# Patient Record
Sex: FEMALE | Race: WHITE | NOT HISPANIC OR LATINO | Employment: FULL TIME | URBAN - METROPOLITAN AREA
[De-identification: names, ages, dates, MRNs, and addresses within clinical notes are randomized per-mention and may not be internally consistent; named-entity substitution may affect disease eponyms.]

---

## 2024-01-23 ENCOUNTER — OFFICE VISIT (OUTPATIENT)
Dept: PRIMARY CARE | Facility: CLINIC | Age: 39
End: 2024-01-23
Payer: COMMERCIAL

## 2024-01-23 VITALS — BODY MASS INDEX: 35.15 KG/M2 | WEIGHT: 245 LBS

## 2024-01-23 DIAGNOSIS — R11.0 NAUSEA: ICD-10-CM

## 2024-01-23 DIAGNOSIS — K62.89 RECTAL DISCOMFORT: ICD-10-CM

## 2024-01-23 DIAGNOSIS — K62.5 RECTAL BLEEDING: Primary | ICD-10-CM

## 2024-01-23 PROCEDURE — 4004F PT TOBACCO SCREEN RCVD TLK: CPT | Performed by: FAMILY MEDICINE

## 2024-01-23 PROCEDURE — 99212 OFFICE O/P EST SF 10 MIN: CPT | Performed by: FAMILY MEDICINE

## 2024-01-23 ASSESSMENT — ENCOUNTER SYMPTOMS
BLOOD IN STOOL: 1
NAUSEA: 1
RECTAL PAIN: 1
CONSTITUTIONAL NEGATIVE: 1
CARDIOVASCULAR NEGATIVE: 1
RESPIRATORY NEGATIVE: 1
ABDOMINAL PAIN: 1

## 2024-01-23 ASSESSMENT — PAIN SCALES - GENERAL: PAINLEVEL: 4

## 2024-01-23 NOTE — PROGRESS NOTES
"Subjective   Patient ID: Margarita Caruso is a 38 y.o. female who presents for GI Problem (Nausea, intermittent rectal bleeding, lower abdominal discomfort for about 2 months. Has tried ibuprofen for pressure in abdomen and also metamucil.).    GI problem: began 12/12 when she had a normal bowel movement followed by blood and mucus, on the same day she began having rectal discomfort and pressure, on 12/23 the symptoms subsided, symptoms began again on 1/8 with a normal bowel movement followed by blood and mucus, rectal discomfort began again and has not resolved, rectal discomfort turns into pain at the end of the day, when she stands up she feels as if the intestines are going to \"pop out\", has pain in the sigmoid area with every bowel movement, once the stool passes the pain resolves quickly, she has also experienced nausea and lower abdominal discomfort with these symptoms, partially attributes lower abdominal discomfort with ovulation, admits to stress from the symptoms, denies fatigue, has taken ibuprofen and Metamucil, diagnosed with diverticulosis and endometriosis via CT of the abdomen and pelvis in 2021, has never had a colonoscopy.    Review of Systems   Constitutional: Negative.    Respiratory: Negative.     Cardiovascular: Negative.    Gastrointestinal:  Positive for abdominal pain, blood in stool, nausea and rectal pain.     Objective   Wt 111 kg (245 lb)   BMI 35.15 kg/m²     Physical Exam  Vitals reviewed.   Constitutional:       Appearance: Normal appearance. She is obese.   Cardiovascular:      Rate and Rhythm: Normal rate and regular rhythm.   Pulmonary:      Effort: Pulmonary effort is normal.      Breath sounds: Normal breath sounds.   Abdominal:      General: Bowel sounds are normal.      Palpations: Abdomen is soft. There is no mass.      Tenderness: There is no abdominal tenderness. There is no guarding.   Neurological:      Mental Status: She is alert.     Assessment/Plan   Diagnoses and " all orders for this visit:  Rectal bleeding/Rectal discomfort  New.  Referral to Gastroenterology  Await input.  Follow up as directed.   Nausea  New.  Secondary to rectal bleeding/discomfort.  See plan above.

## 2024-07-24 ENCOUNTER — APPOINTMENT (OUTPATIENT)
Dept: PRIMARY CARE | Facility: CLINIC | Age: 39
End: 2024-07-24
Payer: COMMERCIAL

## 2024-07-26 ENCOUNTER — APPOINTMENT (OUTPATIENT)
Dept: PRIMARY CARE | Facility: CLINIC | Age: 39
End: 2024-07-26
Payer: COMMERCIAL

## 2024-07-30 ENCOUNTER — APPOINTMENT (OUTPATIENT)
Dept: PRIMARY CARE | Facility: CLINIC | Age: 39
End: 2024-07-30
Payer: COMMERCIAL

## 2024-08-02 ENCOUNTER — APPOINTMENT (OUTPATIENT)
Dept: RADIOLOGY | Facility: CLINIC | Age: 39
End: 2024-08-02
Payer: COMMERCIAL

## 2024-08-02 DIAGNOSIS — Z12.31 SCREENING MAMMOGRAM FOR BREAST CANCER: ICD-10-CM

## 2024-08-19 ENCOUNTER — APPOINTMENT (OUTPATIENT)
Dept: PRIMARY CARE | Facility: CLINIC | Age: 39
End: 2024-08-19
Payer: COMMERCIAL

## 2024-08-19 VITALS
HEIGHT: 69 IN | OXYGEN SATURATION: 97 % | HEART RATE: 94 BPM | SYSTOLIC BLOOD PRESSURE: 122 MMHG | TEMPERATURE: 95.9 F | WEIGHT: 248 LBS | DIASTOLIC BLOOD PRESSURE: 80 MMHG | BODY MASS INDEX: 36.73 KG/M2

## 2024-08-19 DIAGNOSIS — K58.1 IRRITABLE BOWEL SYNDROME WITH CONSTIPATION: ICD-10-CM

## 2024-08-19 DIAGNOSIS — K21.00 GASTROESOPHAGEAL REFLUX DISEASE WITH ESOPHAGITIS WITHOUT HEMORRHAGE: ICD-10-CM

## 2024-08-19 DIAGNOSIS — K11.3 SALIVARY GLAND ABSCESS: Primary | ICD-10-CM

## 2024-08-19 DIAGNOSIS — R11.0 NAUSEA: ICD-10-CM

## 2024-08-19 DIAGNOSIS — K59.00 CONSTIPATION, UNSPECIFIED CONSTIPATION TYPE: ICD-10-CM

## 2024-08-19 PROBLEM — E66.01 MORBID OBESITY (MULTI): Status: ACTIVE | Noted: 2024-08-19

## 2024-08-19 PROBLEM — F17.200 SMOKER: Status: ACTIVE | Noted: 2024-08-19

## 2024-08-19 PROBLEM — K21.9 GASTROESOPHAGEAL REFLUX DISEASE WITHOUT ESOPHAGITIS: Status: ACTIVE | Noted: 2022-07-20

## 2024-08-19 PROBLEM — K62.5 RECTAL HEMORRHAGE: Status: ACTIVE | Noted: 2024-08-19

## 2024-08-19 PROBLEM — K62.89 RECTAL PAIN: Status: ACTIVE | Noted: 2024-08-19

## 2024-08-19 PROBLEM — R25.3 MUSCLE TWITCH: Status: ACTIVE | Noted: 2024-08-19

## 2024-08-19 PROBLEM — G25.81 RESTLESS LEGS: Status: ACTIVE | Noted: 2024-08-19

## 2024-08-19 PROBLEM — J06.9 UPPER RESPIRATORY INFECTION: Status: ACTIVE | Noted: 2024-08-19

## 2024-08-19 PROBLEM — K57.30 DIVERTICULOSIS OF COLON: Status: ACTIVE | Noted: 2024-08-19

## 2024-08-19 PROBLEM — J45.20 MILD INTERMITTENT ASTHMA (HHS-HCC): Status: ACTIVE | Noted: 2024-08-19

## 2024-08-19 PROBLEM — E78.6 HIGH-DENSITY LIPOPROTEIN DEFICIENCY: Status: ACTIVE | Noted: 2024-08-19

## 2024-08-19 PROBLEM — U07.1 DISEASE DUE TO SEVERE ACUTE RESPIRATORY SYNDROME CORONAVIRUS 2 (SARS-COV-2): Status: ACTIVE | Noted: 2024-08-19

## 2024-08-19 PROBLEM — M25.50 ARTHRALGIA: Status: ACTIVE | Noted: 2024-08-19

## 2024-08-19 PROBLEM — M54.10 RADICULAR PAIN: Status: ACTIVE | Noted: 2024-08-19

## 2024-08-19 PROBLEM — H65.23 BILATERAL CHRONIC SEROUS OTITIS MEDIA: Status: ACTIVE | Noted: 2024-08-19

## 2024-08-19 PROBLEM — R63.5 WEIGHT GAIN: Status: ACTIVE | Noted: 2024-08-19

## 2024-08-19 PROBLEM — E03.9 HYPOTHYROIDISM: Status: ACTIVE | Noted: 2024-08-19

## 2024-08-19 PROBLEM — N80.9 ENDOMETRIOSIS: Status: ACTIVE | Noted: 2024-08-19

## 2024-08-19 PROBLEM — E16.2 HYPOGLYCEMIA: Status: ACTIVE | Noted: 2024-08-19

## 2024-08-19 PROBLEM — R53.83 FATIGUE: Status: ACTIVE | Noted: 2024-08-19

## 2024-08-19 PROCEDURE — 3008F BODY MASS INDEX DOCD: CPT

## 2024-08-19 PROCEDURE — 4004F PT TOBACCO SCREEN RCVD TLK: CPT

## 2024-08-19 PROCEDURE — 99204 OFFICE O/P NEW MOD 45 MIN: CPT

## 2024-08-19 RX ORDER — CEPHALEXIN 500 MG/1
500 CAPSULE ORAL 2 TIMES DAILY
Qty: 14 CAPSULE | Refills: 0 | Status: SHIPPED | OUTPATIENT
Start: 2024-08-19 | End: 2024-08-26

## 2024-08-19 RX ORDER — SUCRALFATE 1 G/1
1 TABLET ORAL
Qty: 40 TABLET | Refills: 0 | Status: SHIPPED | OUTPATIENT
Start: 2024-08-19 | End: 2024-08-29

## 2024-08-19 RX ORDER — ESOMEPRAZOLE MAGNESIUM 40 MG/1
40 CAPSULE, DELAYED RELEASE ORAL
Qty: 30 CAPSULE | Refills: 1 | Status: SHIPPED | OUTPATIENT
Start: 2024-08-19 | End: 2024-10-18

## 2024-08-19 ASSESSMENT — PATIENT HEALTH QUESTIONNAIRE - PHQ9
SUM OF ALL RESPONSES TO PHQ9 QUESTIONS 1 AND 2: 0
1. LITTLE INTEREST OR PLEASURE IN DOING THINGS: NOT AT ALL
2. FEELING DOWN, DEPRESSED OR HOPELESS: NOT AT ALL

## 2024-08-19 ASSESSMENT — ENCOUNTER SYMPTOMS
LOSS OF SENSATION IN FEET: 0
OCCASIONAL FEELINGS OF UNSTEADINESS: 0
DEPRESSION: 0

## 2024-08-19 NOTE — PROGRESS NOTES
"Subjective   Patient ID: Margarita Caruso is a 39 y.o. female who presents for New Patient Visit (Swollen lymph node on L side of neck x2 months. Had a colonoscopy in march where she had polyps removed. She then noticed abdominal pain/discomfort on left side upper abdominal. She states it feels full and if she bends over it clicks and pops).  Margarita is a 39 year old patient who presents today to establish care  She has been experiencing multiple problems which she would like to address    LUQ abdominal pain  --She has long standing history of IBS like symptoms, Constipation with diarrhea at times, incomplete bowel emptying  --She had Colonoscopy earlier this year, she states her symptoms have \"never been better\" since the bowel prep for the scope.   --Her symptoms returned a couple of months after  --Modifiable risk factors include: poor diet, smoking (1.5PPD for 25 years)  --she takes TUMS and Gas-X which helps intermittently  --recommend gastro consult, ordered carafate and PPI for trial   --differential includes Peptic Ulcer, Esophagitis d/t GERD      Enlarged Lymph node  --present for many months, did improve with two doses of old abx  --trial 3rd gen cephalosporin, if no better obtain US        Vitals:    08/19/24 1400   BP: 122/80   Pulse: 94   Temp: 35.5 °C (95.9 °F)   SpO2: 97%       Review of Systems    Objective   Physical Exam  Vitals and nursing note reviewed.   Constitutional:       General: She is not in acute distress.     Appearance: Normal appearance.   Abdominal:      General: Abdomen is flat. Bowel sounds are increased. There is no distension.      Tenderness: There is generalized abdominal tenderness and tenderness in the epigastric area. There is no right CVA tenderness, left CVA tenderness or guarding. Negative signs include Cooney's sign, McBurney's sign and psoas sign.       Lymphadenopathy:      Head:      Left side of head: Submandibular adenopathy present.      Comments: Left " submandibular lymph node enlargement without pain, not movable.   Neurological:      Mental Status: She is alert.         Assessment/Plan   Problem List Items Addressed This Visit       Nausea    Gastroesophageal reflux disease with esophagitis without hemorrhage    Relevant Medications    esomeprazole (NexIUM) 40 mg DR capsule    sucralfate (Carafate) 1 gram tablet    Other Relevant Orders    Referral to Gastroenterology    Irritable bowel syndrome with constipation    Relevant Medications    esomeprazole (NexIUM) 40 mg DR capsule    sucralfate (Carafate) 1 gram tablet    Other Relevant Orders    Referral to Gastroenterology    Salivary gland abscess - Primary    Relevant Medications    cephalexin (Keflex) 500 mg capsule            Thank you for coming in today, please call my office if you have any concerns or questions.     Devang FULTON, CNP

## 2024-08-19 NOTE — PATIENT INSTRUCTIONS
Try to cut down on smoking, NSAIDs  Let me know if no improvement of swelling, we will do US  Gastro referral and Nexium / Carafate    Schedule for yearly physical in the near future    Thank you for coming in today, if any questions or concerns arise, please call my office.   Devang Callejas, APRN-CNP

## 2024-08-27 ENCOUNTER — APPOINTMENT (OUTPATIENT)
Dept: GASTROENTEROLOGY | Facility: CLINIC | Age: 39
End: 2024-08-27
Payer: COMMERCIAL

## 2024-09-05 DIAGNOSIS — K58.1 IRRITABLE BOWEL SYNDROME WITH CONSTIPATION: ICD-10-CM

## 2024-09-05 DIAGNOSIS — K21.00 GASTROESOPHAGEAL REFLUX DISEASE WITH ESOPHAGITIS WITHOUT HEMORRHAGE: ICD-10-CM

## 2024-09-05 RX ORDER — SUCRALFATE 1 G/1
1 TABLET ORAL
Qty: 40 TABLET | Refills: 0 | Status: SHIPPED | OUTPATIENT
Start: 2024-09-05 | End: 2024-09-15

## 2024-09-27 ENCOUNTER — APPOINTMENT (OUTPATIENT)
Dept: GASTROENTEROLOGY | Facility: CLINIC | Age: 39
End: 2024-09-27
Payer: COMMERCIAL

## 2024-10-22 ENCOUNTER — APPOINTMENT (OUTPATIENT)
Dept: GASTROENTEROLOGY | Facility: CLINIC | Age: 39
End: 2024-10-22
Payer: COMMERCIAL

## 2024-10-22 VITALS
WEIGHT: 259.1 LBS | DIASTOLIC BLOOD PRESSURE: 81 MMHG | HEIGHT: 69 IN | SYSTOLIC BLOOD PRESSURE: 132 MMHG | TEMPERATURE: 98.4 F | HEART RATE: 88 BPM | BODY MASS INDEX: 38.38 KG/M2 | OXYGEN SATURATION: 98 % | RESPIRATION RATE: 18 BRPM

## 2024-10-22 DIAGNOSIS — K21.00 GASTROESOPHAGEAL REFLUX DISEASE WITH ESOPHAGITIS WITHOUT HEMORRHAGE: ICD-10-CM

## 2024-10-22 DIAGNOSIS — K58.1 IRRITABLE BOWEL SYNDROME WITH CONSTIPATION: ICD-10-CM

## 2024-10-22 PROCEDURE — 3008F BODY MASS INDEX DOCD: CPT

## 2024-10-22 PROCEDURE — 99204 OFFICE O/P NEW MOD 45 MIN: CPT

## 2024-10-22 PROCEDURE — 4004F PT TOBACCO SCREEN RCVD TLK: CPT

## 2024-10-22 RX ORDER — PSYLLIUM SEED
PACKET (EA) ORAL
Qty: 660 G | Refills: 3 | Status: SHIPPED | OUTPATIENT
Start: 2024-10-22

## 2024-10-22 RX ORDER — OMEPRAZOLE 40 MG/1
40 CAPSULE, DELAYED RELEASE ORAL
Qty: 90 CAPSULE | Refills: 0 | Status: SHIPPED | OUTPATIENT
Start: 2024-10-22 | End: 2025-01-20

## 2024-10-22 RX ORDER — OMEPRAZOLE 20 MG/1
20 TABLET, DELAYED RELEASE ORAL
COMMUNITY

## 2024-10-22 RX ORDER — FAMOTIDINE 20 MG/1
20 TABLET, FILM COATED ORAL 2 TIMES DAILY PRN
Qty: 180 TABLET | Refills: 0 | Status: SHIPPED | OUTPATIENT
Start: 2024-10-22 | End: 2025-01-20

## 2024-10-22 ASSESSMENT — ENCOUNTER SYMPTOMS
ROS GI COMMENTS: SEE HPI
ABDOMINAL PAIN: 1
OCCASIONAL FEELINGS OF UNSTEADINESS: 0
UNEXPECTED WEIGHT CHANGE: 1
ABDOMINAL DISTENTION: 1

## 2024-10-22 ASSESSMENT — PAIN SCALES - GENERAL: PAINLEVEL_OUTOF10: 2

## 2024-10-22 NOTE — ASSESSMENT & PLAN NOTE
Persistent GERD symptoms despite PPI therapy and Carafate  Currently taking Prilosec OTC, will increase dose to 40 mg once daily in the morning  Famotidine 20 mg twice daily as needed  Smoking cessation, decreasing coffee intake encouraged  EGD ordered for further evaluation-will need to be off PPI therapy for 2 weeks prior to procedure  Orders:    Referral to Gastroenterology    omeprazole (PriLOSEC) 40 mg DR capsule; Take 1 capsule (40 mg) by mouth once daily in the morning. Take before meals. Do not crush or chew.  Take on an empty stomach 30 mins prior to breakfast.    Esophagogastroduodenoscopy (EGD); Future    famotidine (Pepcid) 20 mg tablet; Take 1 tablet (20 mg) by mouth 2 times a day as needed for heartburn or indigestion (nausea).

## 2024-10-22 NOTE — PATIENT INSTRUCTIONS
Restart Metamucil and probiotics   EGD ordered at [facility]  Change omeprazole dose to 40 mg daily- take on an empty stomach 30 mins before a meal- stop 2 weeks before the procedure, then resume after. Okay to take famotidine and TUMS. NO PEPTO.   Famotidine 20 mg ordered twice daily as needed  Follow up in 2-3 months

## 2024-10-22 NOTE — PROGRESS NOTES
History Of Present Illness  Margarita Caruso is a 39 y.o. female presenting to GI clinic with a chief complaint of heartburn, indigestion, reflux.  Symptoms started earlier this summer, she had left upper quadrant pain and sensation of trapped air in her abdomen.  She felt tightness and burning in the left upper quadrant.  Symptoms worsened 15 minutes to an hour after eating or drinking coffee.  She was told that she had an ulcer and was started on Nexium and Carafate by her PCP which worked for about a month, then gradually stopped working.  Now she is taking Prilose OTC and has been feeling okay for the last couple of days but symptoms are still present.    Patient thinks that she has IBS-she has always had inconsistent bowel movements since childhood.  She was on Metamucil and probiotics in the past but stopped them around the time for colonoscopy.  She states that she just grew out of the habit of taking them.  Patient underwent colonoscopy for evaluation of rectal discomfort and BRBPR.  She had internal hemorrhoids and polyps.    Social History  She reports that she has been smoking cigarettes. She has never used smokeless tobacco. She reports that she does not drink alcohol and does not use drugs.  She does not take NSAIDs on a regular basis    Family History  Family History   Problem Relation Name Age of Onset    Thyroid cancer Mother      Hashimoto's thyroiditis Mother      Cholelithiasis Mother      Lung cancer Father      Transient ischemic attack Sister      Early menopause Sister       The patient does not have a FH of CRC. she does not have a FH of IBD. Grandfather had 12 polyps removed and became septic at 74.     Review of Systems   Constitutional:  Positive for unexpected weight change (Weight gain).   Gastrointestinal:  Positive for abdominal distention and abdominal pain.        See HPI   All other systems reviewed and are negative.        Physical Exam  Constitutional:       General: She is not  "in acute distress.     Appearance: Normal appearance. She is obese. She is not ill-appearing.   HENT:      Head: Normocephalic and atraumatic.      Mouth/Throat:      Mouth: Mucous membranes are moist.   Eyes:      General: No scleral icterus.     Conjunctiva/sclera: Conjunctivae normal.      Pupils: Pupils are equal, round, and reactive to light.   Pulmonary:      Effort: Pulmonary effort is normal.   Abdominal:      General: Bowel sounds are normal. There is no distension.      Palpations: Abdomen is soft. There is no mass.      Tenderness: There is no abdominal tenderness.      Hernia: No hernia is present.   Musculoskeletal:         General: Normal range of motion.      Cervical back: Normal range of motion.   Skin:     General: Skin is warm and dry.      Coloration: Skin is not jaundiced or pale.   Neurological:      Mental Status: She is alert. Mental status is at baseline.   Psychiatric:         Mood and Affect: Mood normal.         Behavior: Behavior normal.          Last Vital Signs  /81 (BP Location: Left arm, Patient Position: Sitting, BP Cuff Size: Large adult)   Pulse 88   Temp 36.9 °C (98.4 °F)   Resp 18   Ht 1.753 m (5' 9\")   Wt 118 kg (259 lb 1.6 oz)   SpO2 98%   BMI 38.26 kg/m²      Relevant Results  Lab Results   Component Value Date    WBC 10.4 10/25/2017    HGB 15.9 10/25/2017    HCT 46.1 (H) 10/25/2017    MCV 92 10/25/2017     10/25/2017      Lab Results   Component Value Date    GLUCOSE 96 01/14/2022    CALCIUM 8.9 01/14/2022     01/14/2022    K 4.5 01/14/2022    CO2 23 (L) 01/14/2022     01/14/2022    BUN 9 01/14/2022    CREATININE 0.7 01/14/2022      Lab Results   Component Value Date    ALT 23 01/14/2022    AST 18 01/14/2022    ALKPHOS 56 01/14/2022    BILITOT 0.5 01/14/2022    INR 0.9 10/25/2017    No results found for: \"IRON\", \"TIBC\", \"IRONSAT\", \"FERRITIN\", \"GERUTRAT50\", \"FOLATE\"  No results found for: \"CALPS\", \"CRP\" No results found for: \"LIPASE\" No results " "found for: \"HGBA1C\"     EGD/COLONOSCOPY/COLOGUARD  EGD-never       Colonoscopy 2/7/2024 with Dr. Gibson- polyps, IH- no report available        IMAGING  CT ABD/PEL W IV CON 2/11/2021 [facility]  IMPRESSION:  Complex right adnexal cyst probably hemorrhagic ovarian cyst and probably  corresponding to hypoechoic structure noted within right pelvis on  ultrasound performed on the same date.  No other pelvic masses or fluid collection.  Few sigmoid diverticuli without CT evidence for diverticulitis.  No bowel obstruction.  Splenic size upper limits of normal.    === 10/23/17 ===  CT ABDOMEN PELVIS W WO CONTRAST  - Impression -  No urinary tract stones are identified.  No acute pathologic findings are identified.  Assessment & Plan  Gastroesophageal reflux disease with esophagitis without hemorrhage  Persistent GERD symptoms despite PPI therapy and Carafate  Currently taking Prilosec OTC, will increase dose to 40 mg once daily in the morning  Famotidine 20 mg twice daily as needed  Smoking cessation, decreasing coffee intake encouraged  EGD ordered for further evaluation-will need to be off PPI therapy for 2 weeks prior to procedure  Orders:    Referral to Gastroenterology    omeprazole (PriLOSEC) 40 mg DR capsule; Take 1 capsule (40 mg) by mouth once daily in the morning. Take before meals. Do not crush or chew.  Take on an empty stomach 30 mins prior to breakfast.    Esophagogastroduodenoscopy (EGD); Future    famotidine (Pepcid) 20 mg tablet; Take 1 tablet (20 mg) by mouth 2 times a day as needed for heartburn or indigestion (nausea).    Irritable bowel syndrome with constipation  Restart Metamucil and probiotics   Orders:    Referral to Gastroenterology    psyllium husk (MetamuciL) 3.4 gram/5.4 gram powder; Start with one teaspoon a day for one week, then increase to two teaspoons a day for one week, then increase to one tablespoon daily. Mix fiber in at least 8 ounces of water/uncarbonated beverage.    Follow-up after EGD is " completed    DONALDO Torres-CNP

## 2024-10-22 NOTE — ASSESSMENT & PLAN NOTE
Restart Metamucil and probiotics   Orders:    Referral to Gastroenterology    psyllium husk (MetamuciL) 3.4 gram/5.4 gram powder; Start with one teaspoon a day for one week, then increase to two teaspoons a day for one week, then increase to one tablespoon daily. Mix fiber in at least 8 ounces of water/uncarbonated beverage.

## 2024-10-28 ENCOUNTER — APPOINTMENT (OUTPATIENT)
Dept: GASTROENTEROLOGY | Facility: CLINIC | Age: 39
End: 2024-10-28
Payer: COMMERCIAL

## 2024-12-02 ENCOUNTER — APPOINTMENT (OUTPATIENT)
Dept: GASTROENTEROLOGY | Facility: CLINIC | Age: 39
End: 2024-12-02
Payer: COMMERCIAL

## 2024-12-06 ENCOUNTER — TELEPHONE (OUTPATIENT)
Dept: PREADMISSION TESTING | Facility: HOSPITAL | Age: 39
End: 2024-12-06

## 2024-12-09 ENCOUNTER — ANESTHESIA EVENT (OUTPATIENT)
Dept: OPERATING ROOM | Facility: HOSPITAL | Age: 39
End: 2024-12-09
Payer: COMMERCIAL

## 2024-12-10 ENCOUNTER — HOSPITAL ENCOUNTER (OUTPATIENT)
Dept: OPERATING ROOM | Facility: HOSPITAL | Age: 39
Setting detail: OUTPATIENT SURGERY
Discharge: HOME | End: 2024-12-10
Payer: COMMERCIAL

## 2024-12-10 ENCOUNTER — ANESTHESIA (OUTPATIENT)
Dept: OPERATING ROOM | Facility: HOSPITAL | Age: 39
End: 2024-12-10
Payer: COMMERCIAL

## 2024-12-10 VITALS
HEIGHT: 69 IN | OXYGEN SATURATION: 97 % | WEIGHT: 260.14 LBS | SYSTOLIC BLOOD PRESSURE: 123 MMHG | DIASTOLIC BLOOD PRESSURE: 73 MMHG | RESPIRATION RATE: 16 BRPM | TEMPERATURE: 97.5 F | BODY MASS INDEX: 38.53 KG/M2 | HEART RATE: 83 BPM

## 2024-12-10 DIAGNOSIS — K21.00 GASTROESOPHAGEAL REFLUX DISEASE WITH ESOPHAGITIS WITHOUT HEMORRHAGE: ICD-10-CM

## 2024-12-10 PROCEDURE — 7100000010 HC PHASE TWO TIME - EACH INCREMENTAL 1 MINUTE: Performed by: ANESTHESIOLOGY

## 2024-12-10 PROCEDURE — 3700000002 HC GENERAL ANESTHESIA TIME - EACH INCREMENTAL 1 MINUTE: Performed by: ANESTHESIOLOGY

## 2024-12-10 PROCEDURE — 2500000004 HC RX 250 GENERAL PHARMACY W/ HCPCS (ALT 636 FOR OP/ED)

## 2024-12-10 PROCEDURE — 3700000001 HC GENERAL ANESTHESIA TIME - INITIAL BASE CHARGE: Performed by: ANESTHESIOLOGY

## 2024-12-10 PROCEDURE — 3600000007 HC OR TIME - EACH INCREMENTAL 1 MINUTE - PROCEDURE LEVEL TWO: Performed by: ANESTHESIOLOGY

## 2024-12-10 PROCEDURE — 43239 EGD BIOPSY SINGLE/MULTIPLE: CPT | Performed by: INTERNAL MEDICINE

## 2024-12-10 PROCEDURE — A43239 PR EDG TRANSORAL BIOPSY SINGLE/MULTIPLE: Performed by: ANESTHESIOLOGY

## 2024-12-10 PROCEDURE — A43239 PR EDG TRANSORAL BIOPSY SINGLE/MULTIPLE

## 2024-12-10 PROCEDURE — 3600000002 HC OR TIME - INITIAL BASE CHARGE - PROCEDURE LEVEL TWO: Performed by: ANESTHESIOLOGY

## 2024-12-10 PROCEDURE — 7100000009 HC PHASE TWO TIME - INITIAL BASE CHARGE: Performed by: ANESTHESIOLOGY

## 2024-12-10 RX ORDER — ONDANSETRON HYDROCHLORIDE 2 MG/ML
4 INJECTION, SOLUTION INTRAVENOUS ONCE AS NEEDED
Status: DISCONTINUED | OUTPATIENT
Start: 2024-12-10 | End: 2024-12-11 | Stop reason: HOSPADM

## 2024-12-10 RX ORDER — DROPERIDOL 2.5 MG/ML
0.62 INJECTION, SOLUTION INTRAMUSCULAR; INTRAVENOUS ONCE AS NEEDED
Status: DISCONTINUED | OUTPATIENT
Start: 2024-12-10 | End: 2024-12-11 | Stop reason: HOSPADM

## 2024-12-10 RX ORDER — PROPOFOL 10 MG/ML
INJECTION, EMULSION INTRAVENOUS AS NEEDED
Status: DISCONTINUED | OUTPATIENT
Start: 2024-12-10 | End: 2024-12-10

## 2024-12-10 RX ORDER — MIDAZOLAM HYDROCHLORIDE 1 MG/ML
INJECTION INTRAMUSCULAR; INTRAVENOUS AS NEEDED
Status: DISCONTINUED | OUTPATIENT
Start: 2024-12-10 | End: 2024-12-10

## 2024-12-10 RX ORDER — LIDOCAINE HCL/PF 100 MG/5ML
SYRINGE (ML) INTRAVENOUS AS NEEDED
Status: DISCONTINUED | OUTPATIENT
Start: 2024-12-10 | End: 2024-12-10

## 2024-12-10 SDOH — HEALTH STABILITY: MENTAL HEALTH: CURRENT SMOKER: 1

## 2024-12-10 ASSESSMENT — PAIN SCALES - GENERAL
PAINLEVEL_OUTOF10: 0 - NO PAIN
PAIN_LEVEL: 1
PAINLEVEL_OUTOF10: 0 - NO PAIN
PAINLEVEL_OUTOF10: 0 - NO PAIN

## 2024-12-10 ASSESSMENT — COLUMBIA-SUICIDE SEVERITY RATING SCALE - C-SSRS
2. HAVE YOU ACTUALLY HAD ANY THOUGHTS OF KILLING YOURSELF?: NO
6. HAVE YOU EVER DONE ANYTHING, STARTED TO DO ANYTHING, OR PREPARED TO DO ANYTHING TO END YOUR LIFE?: NO
1. IN THE PAST MONTH, HAVE YOU WISHED YOU WERE DEAD OR WISHED YOU COULD GO TO SLEEP AND NOT WAKE UP?: NO

## 2024-12-10 ASSESSMENT — PAIN - FUNCTIONAL ASSESSMENT
PAIN_FUNCTIONAL_ASSESSMENT: 0-10

## 2024-12-10 NOTE — Clinical Note
PATIENT'S PHONE WITH FAMILY. PATIENT'S GLASSES IN BELONGING BAG. BELONGING BAG LABELED AND ON CART.

## 2024-12-10 NOTE — ANESTHESIA PREPROCEDURE EVALUATION
Patient: Margarita Caruso    Procedure Information       Date/Time: 12/10/24 1110    Scheduled providers: Shruthi Mcgowan MD; Ivana Frost DO    Procedure: EGD    Location: [facility] OR            Relevant Problems   Anesthesia (within normal limits)      Pulmonary   (+) Mild intermittent asthma      GI   (+) Gastroesophageal reflux disease with esophagitis without hemorrhage   (+) Gastroesophageal reflux disease without esophagitis   (+) Irritable bowel syndrome with constipation   (+) Rectal hemorrhage      Endocrine   (+) Hypothyroidism   (+) Morbid obesity (Multi)      ID   (+) Disease due to severe acute respiratory syndrome coronavirus 2 (SARS-CoV-2)   (+) Upper respiratory infection      GYN   (+) Endometriosis       Clinical information reviewed:   Tobacco  Allergies  Meds   Med Hx  Surg Hx  OB Status  Fam Hx  Soc   Hx        NPO Detail:  NPO/Void Status  Date of Last Liquid: 12/10/24  Time of Last Liquid: 0800  Date of Last Solid: 12/09/24  Last Intake Type: Clear fluids         Physical Exam    Airway  Mallampati: I  TM distance: >3 FB  Neck ROM: full     Cardiovascular - normal exam  Rate: normal     Dental - normal exam     Pulmonary - normal exam  Breath sounds clear to auscultation     Abdominal            Anesthesia Plan    History of general anesthesia?: yes  History of complications of general anesthesia?: no    ASA 3     MAC     The patient is a current smoker.  Patient was previously instructed to abstain from smoking on day of procedure.  Patient smoked on day of procedure.    intravenous induction   Anesthetic plan and risks discussed with patient.  Use of blood products discussed with patient who.    Plan discussed with CRNA.

## 2024-12-10 NOTE — DISCHARGE INSTRUCTIONS

## 2024-12-10 NOTE — H&P
"History Of Present Illness  Margarita Caruso is a 39 y.o. female presenting GI lab for EGD     Past Medical History  History reviewed. No pertinent past medical history.    Surgical History  Past Surgical History:   Procedure Laterality Date     SECTION, CLASSIC  2003     SECTION, CLASSIC  08/15/2010     SECTION, CLASSIC  2013     SECTION, CLASSIC  2015    HYSTERECTOMY  2015    OVARIAN CYST REMOVAL          Social History  She reports that she has been smoking cigarettes. She started smoking about 28 years ago. She has a 28.9 pack-year smoking history. She has never used smokeless tobacco. She reports that she does not drink alcohol and does not use drugs.    Family History  Family History   Problem Relation Name Age of Onset    Thyroid cancer Mother      Hashimoto's thyroiditis Mother      Cholelithiasis Mother      Lung cancer Father      Transient ischemic attack Sister      Early menopause Sister          Allergies  Sulfa (sulfonamide antibiotics), Bee venom protein (honey bee), and Penicillins    Review of Systems     Physical Exam  Cardiovascular:      Rate and Rhythm: Regular rhythm. Tachycardia present.      Comments: Mild  Pulmonary:      Effort: Pulmonary effort is normal. No respiratory distress.   Neurological:      Mental Status: She is alert and oriented to person, place, and time.          Last Recorded Vitals  Blood pressure (!) 175/95, pulse 96, temperature 36.6 °C (97.9 °F), resp. rate 18, height 1.753 m (5' 9\"), weight 118 kg (260 lb 2.3 oz), SpO2 96%.    Relevant Results             Assessment/Plan   Assessment & Plan  Gastroesophageal reflux disease with esophagitis without hemorrhage             EGD with biopsy      Shruthi Mcgowan MD    "

## 2024-12-10 NOTE — ANESTHESIA POSTPROCEDURE EVALUATION
Patient: Margarita Caruso    Procedure Summary       Date: 12/10/24 Room / Location: [facility] OR    Anesthesia Start: 1133 Anesthesia Stop: 1154    Procedure: EGD Diagnosis: Gastroesophageal reflux disease with esophagitis without hemorrhage    Scheduled Providers: Shruthi Mcgowan MD; Ivana Frost DO Responsible Provider: Ivana Frost DO    Anesthesia Type: MAC ASA Status: 3            Anesthesia Type: MAC    Vitals Value Taken Time   /72 12/10/24 1224   Temp 36.4 °C (97.5 °F) 12/10/24 1155   Pulse 83 12/10/24 1215   Resp 16 12/10/24 1215   SpO2 96 % 12/10/24 1229   Vitals shown include unfiled device data.    Anesthesia Post Evaluation    Patient location during evaluation: PACU  Patient participation: complete - patient participated  Level of consciousness: awake  Pain score: 1  Pain management: adequate  Airway patency: patent  Cardiovascular status: acceptable  Respiratory status: acceptable  Hydration status: acceptable  Postoperative Nausea and Vomiting: none        No notable events documented.

## 2024-12-18 LAB
LABORATORY COMMENT REPORT: NORMAL
PATH REPORT.FINAL DX SPEC: NORMAL
PATH REPORT.GROSS SPEC: NORMAL
PATH REPORT.RELEVANT HX SPEC: NORMAL
PATH REPORT.TOTAL CANCER: NORMAL

## 2025-01-09 ENCOUNTER — APPOINTMENT (OUTPATIENT)
Dept: PRIMARY CARE | Facility: CLINIC | Age: 40
End: 2025-01-09
Payer: COMMERCIAL

## 2025-01-09 VITALS
TEMPERATURE: 98.1 F | SYSTOLIC BLOOD PRESSURE: 140 MMHG | BODY MASS INDEX: 38.79 KG/M2 | HEIGHT: 69 IN | WEIGHT: 261.9 LBS | HEART RATE: 106 BPM | OXYGEN SATURATION: 97 % | DIASTOLIC BLOOD PRESSURE: 84 MMHG

## 2025-01-09 DIAGNOSIS — F17.219 CIGARETTE NICOTINE DEPENDENCE WITH NICOTINE-INDUCED DISORDER: ICD-10-CM

## 2025-01-09 DIAGNOSIS — Z13.89 SCREENING FOR MULTIPLE CONDITIONS: ICD-10-CM

## 2025-01-09 DIAGNOSIS — Z00.00 HEALTHCARE MAINTENANCE: ICD-10-CM

## 2025-01-09 DIAGNOSIS — Z13.1 DIABETES MELLITUS SCREENING: ICD-10-CM

## 2025-01-09 DIAGNOSIS — R03.0 BORDERLINE HYPERTENSION: ICD-10-CM

## 2025-01-09 DIAGNOSIS — E55.9 VITAMIN D DEFICIENCY: ICD-10-CM

## 2025-01-09 DIAGNOSIS — E66.01 MORBID OBESITY (MULTI): Primary | ICD-10-CM

## 2025-01-09 DIAGNOSIS — F33.8 SEASONAL AFFECTIVE DISORDER (CMS-HCC): ICD-10-CM

## 2025-01-09 LAB
ALBUMIN SERPL BCP-MCNC: 4.3 G/DL (ref 3.4–5)
ALP SERPL-CCNC: 55 U/L (ref 33–110)
ALT SERPL W P-5'-P-CCNC: 18 U/L (ref 7–45)
ANION GAP SERPL CALC-SCNC: 11 MMOL/L (ref 10–20)
AST SERPL W P-5'-P-CCNC: 16 U/L (ref 9–39)
BASOPHILS # BLD AUTO: 0.07 X10*3/UL (ref 0–0.1)
BASOPHILS NFR BLD AUTO: 0.6 %
BILIRUB SERPL-MCNC: 0.4 MG/DL (ref 0–1.2)
BUN SERPL-MCNC: 10 MG/DL (ref 6–23)
CALCIUM SERPL-MCNC: 8.6 MG/DL (ref 8.6–10.3)
CHLORIDE SERPL-SCNC: 103 MMOL/L (ref 98–107)
CHOLEST SERPL-MCNC: 170 MG/DL (ref 0–199)
CHOLESTEROL/HDL RATIO: 3.3
CO2 SERPL-SCNC: 27 MMOL/L (ref 21–32)
CREAT SERPL-MCNC: 0.65 MG/DL (ref 0.5–1.05)
EGFRCR SERPLBLD CKD-EPI 2021: >90 ML/MIN/1.73M*2
EOSINOPHIL # BLD AUTO: 0.23 X10*3/UL (ref 0–0.7)
EOSINOPHIL NFR BLD AUTO: 2.1 %
ERYTHROCYTE [DISTWIDTH] IN BLOOD BY AUTOMATED COUNT: 11.9 % (ref 11.5–14.5)
GLUCOSE SERPL-MCNC: 79 MG/DL (ref 74–99)
HCT VFR BLD AUTO: 43.5 % (ref 36–46)
HDLC SERPL-MCNC: 50.8 MG/DL
HGB BLD-MCNC: 15 G/DL (ref 12–16)
IMM GRANULOCYTES # BLD AUTO: 0.04 X10*3/UL (ref 0–0.7)
IMM GRANULOCYTES NFR BLD AUTO: 0.4 % (ref 0–0.9)
LDLC SERPL CALC-MCNC: 72 MG/DL
LYMPHOCYTES # BLD AUTO: 2.76 X10*3/UL (ref 1.2–4.8)
LYMPHOCYTES NFR BLD AUTO: 24.8 %
MCH RBC QN AUTO: 31.8 PG (ref 26–34)
MCHC RBC AUTO-ENTMCNC: 34.5 G/DL (ref 32–36)
MCV RBC AUTO: 92 FL (ref 80–100)
MONOCYTES # BLD AUTO: 0.63 X10*3/UL (ref 0.1–1)
MONOCYTES NFR BLD AUTO: 5.7 %
NEUTROPHILS # BLD AUTO: 7.38 X10*3/UL (ref 1.2–7.7)
NEUTROPHILS NFR BLD AUTO: 66.4 %
NON HDL CHOLESTEROL: 119 MG/DL (ref 0–149)
NRBC BLD-RTO: 0 /100 WBCS (ref 0–0)
PLATELET # BLD AUTO: 316 X10*3/UL (ref 150–450)
POTASSIUM SERPL-SCNC: 4.8 MMOL/L (ref 3.5–5.3)
PROT SERPL-MCNC: 6.4 G/DL (ref 6.4–8.2)
RBC # BLD AUTO: 4.71 X10*6/UL (ref 4–5.2)
SODIUM SERPL-SCNC: 136 MMOL/L (ref 136–145)
TRIGL SERPL-MCNC: 237 MG/DL (ref 0–149)
TSH SERPL-ACNC: 1.11 MIU/L (ref 0.44–3.98)
VLDL: 47 MG/DL (ref 0–40)
WBC # BLD AUTO: 11.1 X10*3/UL (ref 4.4–11.3)

## 2025-01-09 PROCEDURE — 3008F BODY MASS INDEX DOCD: CPT

## 2025-01-09 PROCEDURE — 82607 VITAMIN B-12: CPT

## 2025-01-09 PROCEDURE — 82306 VITAMIN D 25 HYDROXY: CPT

## 2025-01-09 PROCEDURE — 99214 OFFICE O/P EST MOD 30 MIN: CPT

## 2025-01-09 PROCEDURE — 84443 ASSAY THYROID STIM HORMONE: CPT

## 2025-01-09 PROCEDURE — 80053 COMPREHEN METABOLIC PANEL: CPT

## 2025-01-09 PROCEDURE — 85025 COMPLETE CBC W/AUTO DIFF WBC: CPT

## 2025-01-09 PROCEDURE — 80061 LIPID PANEL: CPT

## 2025-01-09 PROCEDURE — 83036 HEMOGLOBIN GLYCOSYLATED A1C: CPT

## 2025-01-09 RX ORDER — ACETAMINOPHEN 500 MG
1 TABLET ORAL DAILY
Qty: 1 EACH | Refills: 0 | Status: SHIPPED | OUTPATIENT
Start: 2025-01-09

## 2025-01-09 RX ORDER — BUPROPION HYDROCHLORIDE 150 MG/1
150 TABLET ORAL EVERY MORNING
Qty: 30 TABLET | Refills: 0 | Status: SHIPPED | OUTPATIENT
Start: 2025-01-09 | End: 2025-02-08

## 2025-01-09 NOTE — PROGRESS NOTES
Subjective   Patient ID: Margarita Caruso is a 39 y.o. female who presents for Obesity (Would like to discuss starting a GLP-1. She has been doing weight watchers for years.).    Subjective  Margarita Caruso is a 39 y.o. female here for discussion regarding weight loss. She has noted a weight gain of approximately 40 pounds over the last several months. History of eating disorders: none. There is a family history positive for obesity in the patient. Previous treatments for obesity include nutritionist consultation, self-directed dieting, supervised diet program, and Weight Watchers. Obesity associated medical conditions: none. Obesity associated medications: none. Cardiovascular risk factors besides obesity: obesity (BMI >= 30 kg/m2).    [unfilled]    Objective  Body mass index is 38.96 kg/m².  [unfilled]     Assessment/Plan  Obesity. I assessed Margarita to be in an action stage with respect to weight loss.  General weight loss/lifestyle modification strategies discussed (elicit support from others; identify saboteurs; non-food rewards, etc).  Behavioral treatment: stress management, Bupropion.  Informal exercise measures discussed, e.g. taking stairs instead of elevator.  Regular aerobic exercise program discussed.  Pharmacotherapy as ordered.          Vitals:    01/09/25 0914   BP: 140/84   Pulse: 106   Temp: 36.7 °C (98.1 °F)   SpO2: 97%       Review of Systems    Objective   Physical Exam    Assessment/Plan   Problem List Items Addressed This Visit       Morbid obesity (Multi) - Primary     Other Visit Diagnoses       Cigarette nicotine dependence with nicotine-induced disorder        Relevant Medications    buPROPion XL (Wellbutrin XL) 150 mg 24 hr tablet    Borderline hypertension        Relevant Medications    blood pressure test kit-large kit    Vitamin D deficiency        Relevant Orders    Vitamin D 25-Hydroxy,Total (for eval of Vitamin D levels)    Screening for multiple conditions        Relevant  Orders    CBC and Auto Differential    Healthcare maintenance        Relevant Orders    Comprehensive Metabolic Panel    Lipid Panel    TSH with reflex to Free T4 if abnormal    Vitamin B12    Diabetes mellitus screening        Relevant Orders    Hemoglobin A1C    Seasonal affective disorder (CMS-HCC)                     Thank you for coming in today, please call my office if you have any concerns or questions.     Devang FULTON, CNP

## 2025-01-09 NOTE — PATIENT INSTRUCTIONS
Wellbutrin start  Check BP at home  See me in 1 month for physical and follow up    Blood owrk today we will go over results at your next appointment.    Thank you for coming in today, if any questions or concerns arise, please call my office.   Devang Callejas, DONALDO-CNP

## 2025-01-10 LAB
25(OH)D3 SERPL-MCNC: 23 NG/ML (ref 30–100)
EST. AVERAGE GLUCOSE BLD GHB EST-MCNC: 97 MG/DL
HBA1C MFR BLD: 5 %
VIT B12 SERPL-MCNC: 368 PG/ML (ref 211–911)

## 2025-01-13 DIAGNOSIS — E78.2 MODERATE MIXED HYPERLIPIDEMIA NOT REQUIRING STATIN THERAPY: Primary | ICD-10-CM

## 2025-01-15 ENCOUNTER — APPOINTMENT (OUTPATIENT)
Dept: PRIMARY CARE | Facility: CLINIC | Age: 40
End: 2025-01-15
Payer: COMMERCIAL

## 2025-01-24 ENCOUNTER — APPOINTMENT (OUTPATIENT)
Dept: PRIMARY CARE | Facility: CLINIC | Age: 40
End: 2025-01-24
Payer: COMMERCIAL

## 2025-01-27 ENCOUNTER — APPOINTMENT (OUTPATIENT)
Dept: RADIOLOGY | Facility: HOSPITAL | Age: 40
End: 2025-01-27
Payer: COMMERCIAL

## 2025-01-27 ENCOUNTER — HOSPITAL ENCOUNTER (EMERGENCY)
Facility: HOSPITAL | Age: 40
Discharge: HOME | End: 2025-01-27
Attending: EMERGENCY MEDICINE
Payer: COMMERCIAL

## 2025-01-27 VITALS
HEIGHT: 69 IN | DIASTOLIC BLOOD PRESSURE: 70 MMHG | WEIGHT: 260 LBS | HEART RATE: 99 BPM | RESPIRATION RATE: 16 BRPM | SYSTOLIC BLOOD PRESSURE: 125 MMHG | OXYGEN SATURATION: 98 % | TEMPERATURE: 98.3 F | BODY MASS INDEX: 38.51 KG/M2

## 2025-01-27 DIAGNOSIS — R11.2 NAUSEA AND VOMITING, UNSPECIFIED VOMITING TYPE: ICD-10-CM

## 2025-01-27 DIAGNOSIS — G43.001 MIGRAINE WITHOUT AURA AND WITH STATUS MIGRAINOSUS, NOT INTRACTABLE: Primary | ICD-10-CM

## 2025-01-27 LAB
ALBUMIN SERPL BCP-MCNC: 4.5 G/DL (ref 3.4–5)
ALP SERPL-CCNC: 59 U/L (ref 33–110)
ALT SERPL W P-5'-P-CCNC: 18 U/L (ref 7–45)
ANION GAP SERPL CALC-SCNC: 15 MMOL/L (ref 10–20)
AST SERPL W P-5'-P-CCNC: 15 U/L (ref 9–39)
BASOPHILS # BLD AUTO: 0.06 X10*3/UL (ref 0–0.1)
BASOPHILS NFR BLD AUTO: 0.4 %
BILIRUB SERPL-MCNC: 0.6 MG/DL (ref 0–1.2)
BUN SERPL-MCNC: 8 MG/DL (ref 6–23)
CALCIUM SERPL-MCNC: 9.2 MG/DL (ref 8.6–10.3)
CHLORIDE SERPL-SCNC: 104 MMOL/L (ref 98–107)
CO2 SERPL-SCNC: 23 MMOL/L (ref 21–32)
CREAT SERPL-MCNC: 0.71 MG/DL (ref 0.5–1.05)
EGFRCR SERPLBLD CKD-EPI 2021: >90 ML/MIN/1.73M*2
EOSINOPHIL # BLD AUTO: 0.16 X10*3/UL (ref 0–0.7)
EOSINOPHIL NFR BLD AUTO: 1 %
ERYTHROCYTE [DISTWIDTH] IN BLOOD BY AUTOMATED COUNT: 11.9 % (ref 11.5–14.5)
GLUCOSE SERPL-MCNC: 101 MG/DL (ref 74–99)
HCT VFR BLD AUTO: 43.1 % (ref 36–46)
HGB BLD-MCNC: 15.3 G/DL (ref 12–16)
IMM GRANULOCYTES # BLD AUTO: 0.07 X10*3/UL (ref 0–0.7)
IMM GRANULOCYTES NFR BLD AUTO: 0.4 % (ref 0–0.9)
LYMPHOCYTES # BLD AUTO: 2.97 X10*3/UL (ref 1.2–4.8)
LYMPHOCYTES NFR BLD AUTO: 19 %
MAGNESIUM SERPL-MCNC: 1.95 MG/DL (ref 1.6–2.4)
MCH RBC QN AUTO: 32.8 PG (ref 26–34)
MCHC RBC AUTO-ENTMCNC: 35.5 G/DL (ref 32–36)
MCV RBC AUTO: 92 FL (ref 80–100)
MONOCYTES # BLD AUTO: 0.98 X10*3/UL (ref 0.1–1)
MONOCYTES NFR BLD AUTO: 6.3 %
NEUTROPHILS # BLD AUTO: 11.38 X10*3/UL (ref 1.2–7.7)
NEUTROPHILS NFR BLD AUTO: 72.9 %
NRBC BLD-RTO: 0 /100 WBCS (ref 0–0)
PLATELET # BLD AUTO: 333 X10*3/UL (ref 150–450)
POTASSIUM SERPL-SCNC: 3.6 MMOL/L (ref 3.5–5.3)
PROT SERPL-MCNC: 7.2 G/DL (ref 6.4–8.2)
RBC # BLD AUTO: 4.67 X10*6/UL (ref 4–5.2)
SODIUM SERPL-SCNC: 138 MMOL/L (ref 136–145)
WBC # BLD AUTO: 15.6 X10*3/UL (ref 4.4–11.3)

## 2025-01-27 PROCEDURE — 96375 TX/PRO/DX INJ NEW DRUG ADDON: CPT

## 2025-01-27 PROCEDURE — 2500000004 HC RX 250 GENERAL PHARMACY W/ HCPCS (ALT 636 FOR OP/ED): Performed by: EMERGENCY MEDICINE

## 2025-01-27 PROCEDURE — 80053 COMPREHEN METABOLIC PANEL: CPT | Performed by: EMERGENCY MEDICINE

## 2025-01-27 PROCEDURE — 36415 COLL VENOUS BLD VENIPUNCTURE: CPT | Performed by: EMERGENCY MEDICINE

## 2025-01-27 PROCEDURE — 96361 HYDRATE IV INFUSION ADD-ON: CPT

## 2025-01-27 PROCEDURE — 70450 CT HEAD/BRAIN W/O DYE: CPT | Performed by: RADIOLOGY

## 2025-01-27 PROCEDURE — 99284 EMERGENCY DEPT VISIT MOD MDM: CPT | Mod: 25 | Performed by: EMERGENCY MEDICINE

## 2025-01-27 PROCEDURE — 83735 ASSAY OF MAGNESIUM: CPT | Performed by: EMERGENCY MEDICINE

## 2025-01-27 PROCEDURE — 70450 CT HEAD/BRAIN W/O DYE: CPT

## 2025-01-27 PROCEDURE — 2500000004 HC RX 250 GENERAL PHARMACY W/ HCPCS (ALT 636 FOR OP/ED)

## 2025-01-27 PROCEDURE — 96374 THER/PROPH/DIAG INJ IV PUSH: CPT

## 2025-01-27 PROCEDURE — 85025 COMPLETE CBC W/AUTO DIFF WBC: CPT | Performed by: EMERGENCY MEDICINE

## 2025-01-27 RX ORDER — DIPHENHYDRAMINE HYDROCHLORIDE 50 MG/ML
25 INJECTION INTRAMUSCULAR; INTRAVENOUS ONCE
Status: COMPLETED | OUTPATIENT
Start: 2025-01-27 | End: 2025-01-27

## 2025-01-27 RX ORDER — KETOROLAC TROMETHAMINE 10 MG/1
10 TABLET, FILM COATED ORAL EVERY 6 HOURS PRN
Qty: 20 TABLET | Refills: 0 | Status: SHIPPED | OUTPATIENT
Start: 2025-01-27 | End: 2025-02-01

## 2025-01-27 RX ORDER — KETOROLAC TROMETHAMINE 15 MG/ML
INJECTION, SOLUTION INTRAMUSCULAR; INTRAVENOUS
Status: COMPLETED
Start: 2025-01-27 | End: 2025-01-27

## 2025-01-27 RX ORDER — KETOROLAC TROMETHAMINE 15 MG/ML
15 INJECTION, SOLUTION INTRAMUSCULAR; INTRAVENOUS ONCE
Status: COMPLETED | OUTPATIENT
Start: 2025-01-27 | End: 2025-01-27

## 2025-01-27 RX ORDER — METOCLOPRAMIDE HYDROCHLORIDE 5 MG/ML
10 INJECTION INTRAMUSCULAR; INTRAVENOUS ONCE
Status: COMPLETED | OUTPATIENT
Start: 2025-01-27 | End: 2025-01-27

## 2025-01-27 RX ORDER — METOCLOPRAMIDE 10 MG/1
10 TABLET ORAL EVERY 8 HOURS PRN
Qty: 24 TABLET | Refills: 0 | Status: SHIPPED | OUTPATIENT
Start: 2025-01-27

## 2025-01-27 RX ADMIN — DIPHENHYDRAMINE HYDROCHLORIDE 25 MG: 50 INJECTION INTRAMUSCULAR; INTRAVENOUS at 20:59

## 2025-01-27 RX ADMIN — KETOROLAC TROMETHAMINE 15 MG: 15 INJECTION, SOLUTION INTRAMUSCULAR; INTRAVENOUS at 21:00

## 2025-01-27 RX ADMIN — METOCLOPRAMIDE 10 MG: 5 INJECTION, SOLUTION INTRAMUSCULAR; INTRAVENOUS at 21:00

## 2025-01-27 RX ADMIN — SODIUM CHLORIDE 1000 ML: 9 INJECTION, SOLUTION INTRAVENOUS at 21:00

## 2025-01-27 ASSESSMENT — PAIN DESCRIPTION - FREQUENCY
FREQUENCY: CONSTANT/CONTINUOUS
FREQUENCY: CONSTANT/CONTINUOUS

## 2025-01-27 ASSESSMENT — PAIN SCALES - PAIN ASSESSMENT IN ADVANCED DEMENTIA (PAINAD): TOTALSCORE: MEDICATION (SEE MAR)

## 2025-01-27 ASSESSMENT — PAIN DESCRIPTION - PAIN TYPE
TYPE: ACUTE PAIN
TYPE: ACUTE PAIN

## 2025-01-27 ASSESSMENT — PAIN - FUNCTIONAL ASSESSMENT
PAIN_FUNCTIONAL_ASSESSMENT: 0-10
PAIN_FUNCTIONAL_ASSESSMENT: 0-10

## 2025-01-27 ASSESSMENT — PAIN DESCRIPTION - ORIENTATION
ORIENTATION: RIGHT
ORIENTATION: RIGHT

## 2025-01-27 ASSESSMENT — PAIN DESCRIPTION - LOCATION
LOCATION: HEAD

## 2025-01-27 ASSESSMENT — PAIN DESCRIPTION - DESCRIPTORS
DESCRIPTORS: ACHING
DESCRIPTORS: ACHING

## 2025-01-27 ASSESSMENT — COLUMBIA-SUICIDE SEVERITY RATING SCALE - C-SSRS
6. HAVE YOU EVER DONE ANYTHING, STARTED TO DO ANYTHING, OR PREPARED TO DO ANYTHING TO END YOUR LIFE?: NO
1. IN THE PAST MONTH, HAVE YOU WISHED YOU WERE DEAD OR WISHED YOU COULD GO TO SLEEP AND NOT WAKE UP?: NO
2. HAVE YOU ACTUALLY HAD ANY THOUGHTS OF KILLING YOURSELF?: NO

## 2025-01-27 ASSESSMENT — PAIN DESCRIPTION - PROGRESSION: CLINICAL_PROGRESSION: GRADUALLY IMPROVING

## 2025-01-27 ASSESSMENT — PAIN SCALES - GENERAL
PAINLEVEL_OUTOF10: 10 - WORST POSSIBLE PAIN
PAINLEVEL_OUTOF10: 10 - WORST POSSIBLE PAIN
PAINLEVEL_OUTOF10: 7

## 2025-01-28 NOTE — DISCHARGE INSTRUCTIONS
Take prescribed medications only as directed/ only as needed.  At START of migraines take prescribed medications only as directed and drink some caffeine.  FOLLOW UP with you  primary care provider in next 3-7 days if possible

## 2025-01-28 NOTE — ED NOTES
Pt ready for discharge, discharge instructions reviewed with patient. IV removed. Pt left ED ambulatory with all belongings.    After discharge medications were ordered and sent to pharmacy, attempted to call patient to make sure she was aware but no answer. Left a message on her voicemail.     Erica Lerma RN  01/27/25 1654

## 2025-01-28 NOTE — ED PROVIDER NOTES
Department of Emergency Medicine   ED  Provider Note  Admit Date/RoomTime: 2025  8:21 PM  ED Room: AC02/AC02                  History of Present Illness:   Margarita Caruso is a 39 y.o. female presenting to the ED for migraine headache, beginning 5 am.  The complaint has been constant, severe in severity, and worsened by nothing.  Patient reports it feels like a migraine to her.  She says this is a severe headache.  She is not on any chronic migraine medications.  No neck pain or stiffness associated with this she awakened with the pain around 5 AM.  It is right-sided it is throbbing in quality she has light sensitivity.  She says she has history of ocular migraines with aura she did not have an aura with this headache.  She is not having any fever or chills.  She has had nausea and some vomiting but no diarrhea.  No unilateral weakness.  No speech change.  She does complain of photophobia and maybe some blurry or double vision.      Review of Systems:   Pertinent positives and review of systems as noted above.  Remaining 10 review of systems is negative or noncontributory to today's episode of care.        --------------------------------------------- PAST HISTORY ---------------------------------------------  Past Medical History:  has no past medical history on file.    Past Surgical History:  has a past surgical history that includes  section, classic (2003);  section, classic (08/15/2010);  section, classic (2013);  section, classic (2015); Hysterectomy (2015); and Ovarian cyst removal ().    Social History:  reports that she has been smoking cigarettes. She started smoking about 29 years ago. She has a 29.1 pack-year smoking history. She has never used smokeless tobacco. She reports that she does not drink alcohol and does not use drugs.    Family History: family history includes Cholelithiasis in her mother; Early menopause in her sister;  Hashimoto's thyroiditis in her mother; Lung cancer in her father; Thyroid cancer in her mother; Transient ischemic attack in her sister. Unless otherwise noted, family history is non contributory    Patient's Medications   New Prescriptions    KETOROLAC (TORADOL) 10 MG TABLET    Take 1 tablet (10 mg) by mouth every 6 hours if needed for moderate pain (4 - 6) for up to 5 days.    METOCLOPRAMIDE (REGLAN) 10 MG TABLET    Take 1 tablet (10 mg) by mouth every 8 hours if needed (Nausea/Vomiting).   Previous Medications    BLOOD PRESSURE TEST KIT-LARGE KIT    1 each once daily.    BUPROPION XL (WELLBUTRIN XL) 150 MG 24 HR TABLET    Take 1 tablet (150 mg) by mouth once daily in the morning. Do not crush, chew, or split.    FAMOTIDINE (PEPCID) 20 MG TABLET    Take 1 tablet (20 mg) by mouth 2 times a day as needed for heartburn or indigestion (nausea).    PSYLLIUM HUSK (METAMUCIL) 3.4 GRAM/5.4 GRAM POWDER    Start with one teaspoon a day for one week, then increase to two teaspoons a day for one week, then increase to one tablespoon daily. Mix fiber in at least 8 ounces of water/uncarbonated beverage.   Modified Medications    No medications on file   Discontinued Medications    No medications on file      The patient’s home medications have been reviewed.    Allergies: Sulfa (sulfonamide antibiotics), Bee venom protein (honey bee), and Penicillins    -------------------------------------------------- RESULTS -------------------------------------------------  All laboratory and radiology results have been personally reviewed by myself   LABS:  Labs Reviewed   CBC WITH AUTO DIFFERENTIAL - Abnormal       Result Value    WBC 15.6 (*)     nRBC 0.0      RBC 4.67      Hemoglobin 15.3      Hematocrit 43.1      MCV 92      MCH 32.8      MCHC 35.5      RDW 11.9      Platelets 333      Neutrophils % 72.9      Immature Granulocytes %, Automated 0.4      Lymphocytes % 19.0      Monocytes % 6.3      Eosinophils % 1.0      Basophils % 0.4   "    Neutrophils Absolute 11.38 (*)     Immature Granulocytes Absolute, Automated 0.07      Lymphocytes Absolute 2.97      Monocytes Absolute 0.98      Eosinophils Absolute 0.16      Basophils Absolute 0.06     COMPREHENSIVE METABOLIC PANEL - Abnormal    Glucose 101 (*)     Sodium 138      Potassium 3.6      Chloride 104      Bicarbonate 23      Anion Gap 15      Urea Nitrogen 8      Creatinine 0.71      eGFR >90      Calcium 9.2      Albumin 4.5      Alkaline Phosphatase 59      Total Protein 7.2      AST 15      Bilirubin, Total 0.6      ALT 18     MAGNESIUM - Normal    Magnesium 1.95           RADIOLOGY:  Interpreted by Radiologist.  CT head wo IV contrast   Final Result   No acute intracranial hemorrhage, mass effect or midline shift.             MACRO:   None.        Signed by: Evan Finkelstein 1/27/2025 9:38 PM   Dictation workstation:   [id number]          No results found for this or any previous visit (from the past 4464 hours).  ------------------------- NURSING NOTES AND VITALS REVIEWED ---------------------------   The nursing notes within the ED encounter and vital signs as below have been reviewed.   /64 (BP Location: Right arm, Patient Position: Sitting)   Pulse 99   Temp 36.9 °C (98.5 °F) (Temporal)   Resp 16   Ht 1.753 m (5' 9\")   Wt 118 kg (260 lb)   SpO2 98%   BMI 38.40 kg/m²   Oxygen Saturation Interpretation: Normal      ---------------------------------------------------PHYSICAL EXAM--------------------------------------  Physical Exam  Vitals and nursing note reviewed.   Constitutional:       General: She is not in acute distress.     Appearance: She is well-developed. She is not ill-appearing or toxic-appearing.   HENT:      Head: Normocephalic and atraumatic.      Mouth/Throat:      Mouth: Mucous membranes are moist.      Pharynx: Oropharynx is clear.   Eyes:      General: No visual field deficit or scleral icterus.     Extraocular Movements: Extraocular movements intact.      " Right eye: Normal extraocular motion and no nystagmus.      Left eye: Normal extraocular motion and no nystagmus.      Conjunctiva/sclera: Conjunctivae normal.      Pupils: Pupils are equal, round, and reactive to light. Pupils are equal.      Right eye: Pupil is round and reactive.      Left eye: Pupil is round and reactive.   Cardiovascular:      Rate and Rhythm: Normal rate and regular rhythm.      Heart sounds: Normal heart sounds. No murmur heard.     No friction rub. No gallop.   Pulmonary:      Effort: Pulmonary effort is normal. No respiratory distress.      Breath sounds: Normal breath sounds. No stridor. No wheezing, rhonchi or rales.   Chest:      Chest wall: No tenderness.   Abdominal:      General: There is no distension.      Palpations: Abdomen is soft.      Tenderness: There is no abdominal tenderness. There is no guarding.   Musculoskeletal:         General: No swelling or tenderness. Normal range of motion.      Cervical back: Normal range of motion and neck supple. No rigidity.   Lymphadenopathy:      Cervical: No cervical adenopathy.   Skin:     General: Skin is warm and dry.      Capillary Refill: Capillary refill takes less than 2 seconds.      Findings: No rash.   Neurological:      Mental Status: She is alert and oriented to person, place, and time.      GCS: GCS eye subscore is 4. GCS verbal subscore is 5. GCS motor subscore is 6.      Cranial Nerves: No cranial nerve deficit, dysarthria or facial asymmetry.      Gait: Gait normal.   Psychiatric:         Mood and Affect: Mood normal.         Speech: Speech normal.            Procedures  None  ------------------------------ ED COURSE/MEDICAL DECISION MAKING----------------------    Medical Decision Making:   Patient was seen and examined by me.    ED Course as of 01/27/25 2246 Mon Jan 27, 2025 2243 Blood cell count for 0.6, hemoglobin 15.3, medic at 43.1, platelet count 333,000, no significant shift.  No fever. [EC]   2244 Comprehensive  metabolic panel(!)  Comprehensive metabolic panel is unremarkable. [EC]   2244 CT imaging of the brain  IMPRESSION:  No acute intracranial hemorrhage, mass effect or midline shift.   [EC]   2244 Patient was seen on reassessment she reports that her headache is much much improved.  She feels she is well enough to go home.    Clinically I think this is migraine headache.  Patient we discharged home in improved and stable condition.  Rx ketorolac 10 mg 4 times daily as needed #20 no refill  Rx metoclopramide 10 mg 1 3 times daily as needed nausea and vomiting #24 no refill    Patient is instructed to follow-up with her primary care.  Patient is instructed return if acutely worsening worrisome symptoms. [EC]      ED Course User Index  [EC] Suresh Tabor DO         Diagnoses as of 01/27/25 2246   Migraine without aura and with status migrainosus, not intractable   Nausea and vomiting, unspecified vomiting type      Counseling:   The emergency provider has spoken with the patient and discussed today’s results, in addition to providing specific details for the plan of care and counseling regarding the diagnosis and prognosis.  Questions are answered at this time and they are agreeable with the plan.      --------------------------------- IMPRESSION AND DISPOSITION ---------------------------------        IMPRESSION  1. Migraine without aura and with status migrainosus, not intractable    2. Nausea and vomiting, unspecified vomiting type        DISPOSITION  Disposition: Discharge to home  Patient condition is fair      Billing Provider Critical Care Time: 0 minutes     Suresh Tabor DO  01/27/25 2246

## 2025-02-10 ENCOUNTER — APPOINTMENT (OUTPATIENT)
Dept: PRIMARY CARE | Facility: CLINIC | Age: 40
End: 2025-02-10
Payer: COMMERCIAL

## 2025-03-05 ENCOUNTER — HOSPITAL ENCOUNTER (OUTPATIENT)
Dept: RADIOLOGY | Facility: HOSPITAL | Age: 40
Discharge: HOME | End: 2025-03-05
Payer: COMMERCIAL

## 2025-03-05 ENCOUNTER — APPOINTMENT (OUTPATIENT)
Dept: NEUROLOGY | Facility: CLINIC | Age: 40
End: 2025-03-05
Payer: COMMERCIAL

## 2025-03-05 DIAGNOSIS — D17.1 BENIGN LIPOMATOUS NEOPLASM OF SKIN AND SUBCUTANEOUS TISSUE OF TRUNK: ICD-10-CM

## 2025-03-05 DIAGNOSIS — R10.9 UNSPECIFIED ABDOMINAL PAIN: ICD-10-CM

## 2025-03-05 PROCEDURE — 76700 US EXAM ABDOM COMPLETE: CPT

## 2025-03-25 ENCOUNTER — HOSPITAL ENCOUNTER (OUTPATIENT)
Dept: CARDIOLOGY | Facility: HOSPITAL | Age: 40
Discharge: HOME | End: 2025-03-25
Payer: COMMERCIAL

## 2025-03-25 ENCOUNTER — APPOINTMENT (OUTPATIENT)
Dept: CARDIOLOGY | Facility: HOSPITAL | Age: 40
End: 2025-03-25
Payer: COMMERCIAL

## 2025-03-25 DIAGNOSIS — I10 ESSENTIAL (PRIMARY) HYPERTENSION: ICD-10-CM

## 2025-03-25 DIAGNOSIS — Z82.49 FAMILY HISTORY OF ISCHEMIC HEART DISEASE AND OTHER DISEASES OF THE CIRCULATORY SYSTEM: ICD-10-CM

## 2025-03-25 DIAGNOSIS — R00.2 PALPITATIONS: ICD-10-CM

## 2025-03-25 PROCEDURE — 93306 TTE W/DOPPLER COMPLETE: CPT

## 2025-03-25 PROCEDURE — 93306 TTE W/DOPPLER COMPLETE: CPT | Performed by: STUDENT IN AN ORGANIZED HEALTH CARE EDUCATION/TRAINING PROGRAM

## 2025-03-27 ENCOUNTER — APPOINTMENT (OUTPATIENT)
Dept: NEUROLOGY | Facility: CLINIC | Age: 40
End: 2025-03-27
Payer: COMMERCIAL

## 2025-03-27 VITALS
BODY MASS INDEX: 37.77 KG/M2 | WEIGHT: 255 LBS | HEIGHT: 69 IN | HEART RATE: 61 BPM | DIASTOLIC BLOOD PRESSURE: 64 MMHG | SYSTOLIC BLOOD PRESSURE: 130 MMHG

## 2025-03-27 DIAGNOSIS — G43.711 INTRACTABLE CHRONIC MIGRAINE WITHOUT AURA AND WITH STATUS MIGRAINOSUS: Primary | ICD-10-CM

## 2025-03-27 LAB
AORTIC VALVE PEAK VELOCITY: 1.39 M/S
AV PEAK GRADIENT: 8 MMHG
AVA (PEAK VEL): 2.06 CM2
EJECTION FRACTION APICAL 4 CHAMBER: 57.3
EJECTION FRACTION: 60 %
LEFT ATRIUM VOLUME AREA LENGTH INDEX BSA: 22.3 ML/M2
LEFT VENTRICLE INTERNAL DIMENSION DIASTOLE: 4.7 CM (ref 3.5–6)
LEFT VENTRICULAR OUTFLOW TRACT DIAMETER: 1.9 CM
MITRAL VALVE E/A RATIO: 1.22
RIGHT VENTRICLE FREE WALL PEAK S': 15.1 CM/S
RIGHT VENTRICLE PEAK SYSTOLIC PRESSURE: 17 MMHG
TRICUSPID ANNULAR PLANE SYSTOLIC EXCURSION: 2.7 CM

## 2025-03-27 PROCEDURE — 3008F BODY MASS INDEX DOCD: CPT | Performed by: PSYCHIATRY & NEUROLOGY

## 2025-03-27 PROCEDURE — 99205 OFFICE O/P NEW HI 60 MIN: CPT | Performed by: PSYCHIATRY & NEUROLOGY

## 2025-03-27 RX ORDER — SUMATRIPTAN SUCCINATE 100 MG/1
50 TABLET ORAL
COMMUNITY
Start: 2025-01-29

## 2025-03-27 RX ORDER — METOPROLOL SUCCINATE 50 MG/1
50 TABLET, EXTENDED RELEASE ORAL DAILY
COMMUNITY
Start: 2025-03-23

## 2025-03-27 RX ORDER — TOPIRAMATE 25 MG/1
25 TABLET ORAL 2 TIMES DAILY
Qty: 60 TABLET | Refills: 2 | Status: SHIPPED | OUTPATIENT
Start: 2025-03-27 | End: 2026-03-27

## 2025-03-27 NOTE — PROGRESS NOTES
Subjective   Margarita Caruso is a 39 y.o.   female.  HPI  This is a 39 year old woman a HA, starting at 5 am, 1/27/25.  She started having migraines, preceded by visual aura- starting with central visual loss, started at age 13, after visual aura resolves, she gets shaky, tired, and then a HA starts- typically 6/10, right vertex, coughing or bending over makes it worse.   She has photophobia plus or minus nausea.  Triggers: artificial cinnamon, barametric pressure changes.  They occur about once a month-no medications taken, drinks water, takes electrolyte replacer.  FH: mother has migraine.  She is a stay at home Mom.  On 1/27/25: the patient woke up with pressure over the right eyebrow, next went to the [employer], experienced excessive sensitivity to light-sunlight glare- stuttering speech, drove home, then relatively sudden onset of imbalance, had a hot bath, then chills through the entire body- she has had pain- 4-5/10, can increase, superficial over the right eyebrow, also had nausea and some vomiting- went to [facility] ED.  There they gave her ketorolac and Reglan- no change in her symptoms.  She had a negative head CT.  Since then, her neuro symptoms have fluctuated, also the head pain.  She has a rocking sensation.  She has not taken any OTC medications.  She is now on HTN medications, has increased HR.  Objective   Neurological Exam  Mental Status: Awake and alert. Oriented to person, place and time. Speech was fluent to history. Naming, repetition and comprehension were intact. Short term memory intact tested by word recall and attention intact tested by serial sevens.      CN: Pupils were 4/4mm to 2/2mm. VF intact to finger counting. Ocular versions were intact. Facial sensation was intact to light touch bilaterally. Facial expression was symmetric. Palate elevated symmetrically. Shoulder shrug was symmetric. Tongue protruded midline.   Fundi-normal  Motor: Normal muscle bulk and tone. Strength was  5/5 bilaterally for shoulder abduction, elbow flexion/extension,  strength, hip flexion, knee flexion/extension, ankle dorsi- and plantar flexion. There were no abnormal movements.     Sensory: Intact to light touch and temperature in all 4 extremities. Does not extinguish to double simultaneous touch.     Coordination: Finger to nose and heel to shin were intact with no dysmetria.     Reflexes: 1+ bilaterally in biceps, brachioradialis, patella, and achilles. Toes were downgoing bilaterally.    Gait: Narrow based and normal. Intact heel-raise, toe-raise and tandem gait.    Physical Exam  I personally reviewed laboratory, radiographic, and medical studies which were pertinent for nothing.    Assessment/Plan

## 2025-03-27 NOTE — PATIENT INSTRUCTIONS
I think you have status migrainosis, possibly trigeminal neuralgia- no particular diagnostic test.  Schedule the MRI of the brain without and with contrast.  Please schedule a dilated funduscopic examination with an Optomotrist or Ophthalmologist.  Start the topiramate at 25 mg twice a day for 2 weeks, and then give us a progress report.

## 2025-04-29 DIAGNOSIS — Z12.31 ENCOUNTER FOR SCREENING MAMMOGRAM FOR BREAST CANCER: ICD-10-CM

## 2025-08-25 ENCOUNTER — APPOINTMENT (OUTPATIENT)
Dept: RHEUMATOLOGY | Facility: CLINIC | Age: 40
End: 2025-08-25
Payer: COMMERCIAL